# Patient Record
Sex: FEMALE | URBAN - METROPOLITAN AREA
[De-identification: names, ages, dates, MRNs, and addresses within clinical notes are randomized per-mention and may not be internally consistent; named-entity substitution may affect disease eponyms.]

---

## 2018-05-09 ENCOUNTER — APPOINTMENT (OUTPATIENT)
Age: 23
Setting detail: DERMATOLOGY
End: 2018-05-09

## 2018-05-09 VITALS — WEIGHT: 175 LBS | HEIGHT: 69 IN

## 2018-05-09 DIAGNOSIS — L30.9 DERMATITIS, UNSPECIFIED: ICD-10-CM

## 2018-05-09 PROBLEM — L70.0 ACNE VULGARIS: Status: ACTIVE | Noted: 2018-05-09

## 2018-05-09 PROCEDURE — 99201: CPT

## 2018-05-09 PROCEDURE — OTHER FOLLOW UP FOR NEXT VISIT: OTHER

## 2018-05-09 PROCEDURE — OTHER TREATMENT REGIMEN: OTHER

## 2018-05-09 ASSESSMENT — LOCATION DETAILED DESCRIPTION DERM
LOCATION DETAILED: RIGHT SUPERIOR VERMILION LIP
LOCATION DETAILED: LEFT SUPERIOR VERMILION LIP

## 2018-05-09 ASSESSMENT — LOCATION SIMPLE DESCRIPTION DERM
LOCATION SIMPLE: RIGHT LIP
LOCATION SIMPLE: LEFT LIP

## 2018-05-09 ASSESSMENT — LOCATION ZONE DERM: LOCATION ZONE: LIP

## 2018-05-09 ASSESSMENT — SEVERITY ASSESSMENT: SEVERITY: CLEAR

## 2018-05-09 ASSESSMENT — BSA RASH: BSA RASH: 0

## 2018-05-09 NOTE — PROCEDURE: FOLLOW UP FOR NEXT VISIT
Detail Level: Simple
Scheduled For Follow Up In (Optional): prn
Instructions (Optional): If any recurrence

## 2018-05-09 NOTE — PROCEDURE: TREATMENT REGIMEN
Plan: Currently there is no visible evidence of any inflammation. I was able to look at several photographs on her phone that showed tiny microvesicles on the lip but no pustules or larger areas of inflammation. There is also some slight dryness, and scaling at the corners of the mouth. This could represent an unusual presentation of impetigo, possible HSV or B12 deficiency. For now patient will observe, I did give her samples of Halog ointment to try if there is a recurrence. I also recommend she called and follow up for an in person exam when there is a flare up to hopefully help better determine what the problem is. I don’t see any evidence that this is likely contact related from toothpaste or mouthwash or she would be having this problem more consistently
Detail Level: Simple
Samples Given: Halog 0.1% ointment